# Patient Record
Sex: FEMALE
[De-identification: names, ages, dates, MRNs, and addresses within clinical notes are randomized per-mention and may not be internally consistent; named-entity substitution may affect disease eponyms.]

---

## 2022-05-20 ENCOUNTER — NON-APPOINTMENT (OUTPATIENT)
Age: 32
End: 2022-05-20

## 2022-05-20 ENCOUNTER — APPOINTMENT (OUTPATIENT)
Dept: OPHTHALMOLOGY | Facility: CLINIC | Age: 32
End: 2022-05-20
Payer: COMMERCIAL

## 2022-05-20 PROCEDURE — 92002 INTRM OPH EXAM NEW PATIENT: CPT

## 2022-11-29 PROBLEM — Z00.00 ENCOUNTER FOR PREVENTIVE HEALTH EXAMINATION: Status: ACTIVE | Noted: 2022-11-29

## 2022-12-27 ENCOUNTER — APPOINTMENT (OUTPATIENT)
Dept: COLORECTAL SURGERY | Facility: CLINIC | Age: 32
End: 2022-12-27

## 2022-12-27 ENCOUNTER — NON-APPOINTMENT (OUTPATIENT)
Age: 32
End: 2022-12-27

## 2022-12-27 VITALS
SYSTOLIC BLOOD PRESSURE: 115 MMHG | WEIGHT: 126 LBS | BODY MASS INDEX: 21.51 KG/M2 | DIASTOLIC BLOOD PRESSURE: 71 MMHG | TEMPERATURE: 98.2 F | HEART RATE: 73 BPM | HEIGHT: 64 IN

## 2022-12-27 DIAGNOSIS — K62.89 OTHER SPECIFIED DISEASES OF ANUS AND RECTUM: ICD-10-CM

## 2022-12-27 PROCEDURE — 99203 OFFICE O/P NEW LOW 30 MIN: CPT | Mod: 25

## 2022-12-27 PROCEDURE — 46600 DIAGNOSTIC ANOSCOPY SPX: CPT

## 2022-12-27 NOTE — HISTORY OF PRESENT ILLNESS
[FreeTextEntry1] : 31yo female presents for initial evaluation\par \par Denies PMH or PSH. Does not take medications. \par Never had a colonoscopy.\par Denies family history.\par \par She states she went to an "anal specialist" (GI?) thinking she had a hemorrhoid. \par \par She reports H/o hemorrhoid in past, had banding in [place] 1.5 years ago and symptoms resolved. \par C/o Pain with every BM. She feels something internally and sensation that something is blocking her from passing stool. She feels tissue pop out with BM, and she pushes it back in.\par Also sees BRB on TP and in toilet bowl. \par \par Seen about 1.5 months ago by GI (does not remember name of doctor), per patient she was diagnosed with an anal papilla and was told she needed to see a surgeon and patient was referred to this practice.\par \par She presents today with a self taken photograph, showing internal hemorrhoidal swelling that is prolapsing, and possible anal fissure and papilla. \par \par Moves bowels every other day. Reports straining with BM since symptoms started, but denies diarrhea or constipation prior to current symptoms.\par Taking miralax since seeing GI, less painful, moving bowels every 2-3 days.\par \par Not currently sexually active. Anally receptive sex in past about 8-10 years ago, denies anal intercourse recently.\par

## 2022-12-27 NOTE — PHYSICAL EXAM
[FreeTextEntry1] : Medical assistant present for duration of physical examination\par \par General no acute distress, alert and oriented\par Psych calm, pleasant demeanor, responding appropriately to questions\par Nonlabored breathing\par Ambulating without assistance\par Skin normal color and pigment, no visible lesions or rashes\par \par Anorectal Exam:\par Inspection no erythema, induration or fluctuance, no skin excoriation, no fissure, right anterior residual external hemorrhoidal tag\par CRISTA nontender, no masses palpated, no blood on gloved finger\par \par Procedure: Anoscopy\par \par Pre procedure Diagnosis: hypertrophied anal papilla\par Post procedure Diagnosis: hypertrophied anal papilla\par Anesthesia: none\par Estimated blood loss: none\par Specimen: none\par Complications: none\par \par Consent obtained. Anoscopy was performed by passing a lighted anoscope with lubricant jelly into the anal canal and the entire anal mucosal surface was inspected. Findings included no fissure, internal hemorrhoids, hypertrophied anal papilla originating from posterior canal\par \par Patient tolerated examination and procedure well.\par \par \par

## 2022-12-27 NOTE — ASSESSMENT
[FreeTextEntry1] : Exam findings and diagnosis were discussed at length with patient.\par Management discussed.\par External hemorrhoid not causing symptoms, recommended supportive care.\par Recommend excision of hypertrophied anal papilla.\par The nature of the procedure as well as risks and benefits were reviewed with the patient. Risk/benefits/alternatives discussed at length, including but not limited to pain, bleeding, infection, swelling, recurrence of symptoms, need for future surgery, scarring, and risk of alteration of bowel habits, which may be temporary or permanent. Postprocedural expectations regarding pain, wound cosmesis, and wound healing was discussed.  The preoperative, intraoperative, and postoperative management were discussed with the patient in detail. All questions were answered, patient expressed understanding, and would like to proceed with the proposed surgery. Informed consent was obtained. Ambulatory surgery instructions were given to patient\par

## 2023-01-10 ENCOUNTER — NON-APPOINTMENT (OUTPATIENT)
Age: 33
End: 2023-01-10

## 2023-01-20 ENCOUNTER — APPOINTMENT (OUTPATIENT)
Dept: COLORECTAL SURGERY | Facility: CLINIC | Age: 33
End: 2023-01-20